# Patient Record
Sex: MALE | URBAN - METROPOLITAN AREA
[De-identification: names, ages, dates, MRNs, and addresses within clinical notes are randomized per-mention and may not be internally consistent; named-entity substitution may affect disease eponyms.]

---
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
## 2023-01-17 NOTE — PROCEDURE: MEDICATION COUNSELING
Pt verbalizes understanding of plan and will call in one week with update.    Tranexamic Acid Pregnancy And Lactation Text: It is unknown if this medication is safe during pregnancy or breast feeding.

## 2023-01-25 ENCOUNTER — APPOINTMENT (OUTPATIENT)
Dept: URBAN - METROPOLITAN AREA CLINIC 319 | Age: 18
Setting detail: DERMATOLOGY
End: 2023-01-25

## 2023-01-31 ENCOUNTER — APPOINTMENT (OUTPATIENT)
Dept: URBAN - METROPOLITAN AREA CLINIC 319 | Age: 18
Setting detail: DERMATOLOGY
End: 2023-01-31

## 2023-01-31 ENCOUNTER — RX ONLY (RX ONLY)
Age: 18
End: 2023-01-31

## 2023-01-31 DIAGNOSIS — L70.0 ACNE VULGARIS: ICD-10-CM

## 2023-01-31 PROCEDURE — OTHER COUNSELING: OTHER

## 2023-01-31 PROCEDURE — 99214 OFFICE O/P EST MOD 30 MIN: CPT

## 2023-01-31 PROCEDURE — OTHER ISOTRETINOIN MONITORING: OTHER

## 2023-01-31 PROCEDURE — OTHER CONSENT FOR TELEMEDICINE VISIT OBTAINED: OTHER

## 2023-01-31 PROCEDURE — OTHER PRESCRIPTION MEDICATION MANAGEMENT: OTHER

## 2023-01-31 PROCEDURE — OTHER COUNSELING: ISOTRETINOIN: OTHER

## 2023-01-31 PROCEDURE — OTHER RECORDS REVIEWED: OTHER

## 2023-01-31 RX ORDER — ISOTRETINOIN 32 MG/1
CAPSULE ORAL
Qty: 30 | Refills: 0 | Status: ERX

## 2023-01-31 NOTE — HPI: MEDICATION (ISOTRETINOIN)
Is This A New Presentation, Or A Follow-Up?: Follow Up Isotretinoin
Additional History: Patient is on his 4th month of absorica LD 32mg QD with a little less than 10 capsules left. He has been noncompliant with regimen last time it was called in was November. He states he has been clear but flared last week due to having strep

## 2023-01-31 NOTE — PROCEDURE: ISOTRETINOIN MONITORING
Dosing Month 3 (Required For Cumulative Dosing): 32mg Daily
Cheilitis Aggressive Treatment: I recommended application of Vaseline or Aquaphor numerous times a day (as often as every hour) and before going to bed. I also prescribed a topical steroid for twice daily use.
Comments: Treatment goal is 100% clearance of inflammatory lesions. Patient is currently on or finishing month #:4th of absorica LD 32mg. We have counseled patient on the importance of taking medication as as directed and may continue until 6-7 months. We will follow up in 2 months, call in new prescription today and he will call back in month for refill
Are Labs Available For Review?: Yes
Hypercholesterolemia Monitoring: I explained this is common when taking isotretinoin. We will monitor closely.
Cheilitis Normal Treatment: I recommended application of Vaseline or Aquaphor numerous times a day (as often as every hour) and before going to bed.
Any Depression?: No
Xerosis Normal Treatment: I recommended application of Cetaphil or CeraVe numerous times a day going to bed to all dry areas.
Retinoid Dermatitis Normal Treatment: I recommended more frequent application of Cetaphil or CeraVe to the areas of dermatitis.
Xerosis Aggressive Treatment: I recommended application of Cetaphil or CeraVe numerous times a day going to bed to all dry areas. I also prescribed a topical steroid for twice daily use.
Xerosis Normal Treatment: I recommended application of Cetaphil or CeraVe numerous times a day and before going to bed to all dry areas.
Is Retinoid Dermatitis Present?: Yes - Normal Treatment
Retinoid Dermatitis Aggressive Treatment: I recommended more frequent application of Cetaphil or CeraVe to the areas of dermatitis. I also prescribed a topical steroid for twice daily use until the dermatitis resolves.
Nosebleeds Normal Treatment: I explained this is common when taking isotretinoin. I recommended saline mist in each nostril multiple times a day. If this worsens they will contact us.
Hypertriglyceridemia Treatment: I explained this is common when taking isotretinoin. If this worsens they will contact us. They may try OTC ibuprofen.
Female Completion Statement: After discussing her treatment course we decided to discontinue isotretinoin therapy at this time. I explained that she would need to continue her birth control methods for at least one month after the last dosage. She should also get a pregnancy test one month after the last dose. She shouldn't donate blood for one month after the last dose. She should call with any new symptoms of depression.
Next Month's Dosage: Continue Current Dosage
Kilograms Preamble Statement (Weight Entered In Details Tab): Reported Weight in kilograms:
Myalgia Monitoring: I explained this is common when taking isotretinoin. If this worsens they will contact us.
Target Cumulative Dosage (In Mg/Kg): 135
Upper Range (In Mg/Kg): 150
Months Of Therapy Completed: 4
Headache Monitoring: I recommended monitoring the headaches for now. There is no evidence of increased intracranial pressure. They were instructed to call if the headaches are worsening.
Pounds Preamble Statement (Weight Entered In Details Tab): Reported Weight in pounds:
Detail Level: Zone
Xerosis Aggressive Treatment: I recommended application of Cetaphil or CeraVe numerous times a day and before going to bed to all dry areas. I also prescribed a topical steroid for twice daily use.
Lower Range (In Mg/Kg): 120
Counseling Text: I reviewed the side effect in detail. Patient should get monthly blood tests, not donate blood, not drive at night if vision affected, and not share medication.
Female Pregnancy Counseling Text: Female patients should also be on two forms of birth control while taking this medication and for one month after their last dose.
Weight Units: pounds
Use Therapeutic Ranged Or Therapeutic Target: please select Range or Target
What Is The Patient's Gender: Male
Male Completion Statement: After discussing his treatment course we decided to discontinue isotretinoin therapy at this time. He shouldn't donate blood for one month after the last dose. He should call with any new symptoms of depression.

## 2023-03-28 ENCOUNTER — RX ONLY (RX ONLY)
Age: 18
End: 2023-03-28

## 2023-03-28 ENCOUNTER — APPOINTMENT (OUTPATIENT)
Dept: URBAN - METROPOLITAN AREA CLINIC 319 | Age: 18
Setting detail: DERMATOLOGY
End: 2023-03-28

## 2023-03-28 DIAGNOSIS — L70.0 ACNE VULGARIS: ICD-10-CM

## 2023-03-28 PROCEDURE — OTHER RECORDS REVIEWED: OTHER

## 2023-03-28 PROCEDURE — OTHER CONSENT FOR TELEMEDICINE VISIT OBTAINED: OTHER

## 2023-03-28 PROCEDURE — OTHER PRESCRIPTION MEDICATION MANAGEMENT: OTHER

## 2023-03-28 PROCEDURE — OTHER COUNSELING: OTHER

## 2023-03-28 PROCEDURE — OTHER COUNSELING: ISOTRETINOIN: OTHER

## 2023-03-28 PROCEDURE — OTHER ISOTRETINOIN MONITORING: OTHER

## 2023-03-28 PROCEDURE — OTHER PHOTO-DOCUMENTATION: OTHER

## 2023-03-28 PROCEDURE — 99214 OFFICE O/P EST MOD 30 MIN: CPT

## 2023-03-28 RX ORDER — ISOTRETINOIN 40 MG/1
CAPSULE, LIQUID FILLED ORAL
Qty: 30 | Refills: 0 | Status: ERX | COMMUNITY
Start: 2023-03-28

## 2023-03-28 NOTE — HPI: MEDICATION (ISOTRETINOIN)
Is This A New Presentation, Or A Follow-Up?: Follow Up Isotretinoin
Additional History: Patient is on his 5th month of isotretinoin 40mg with 15 capsules left. Patient has been non compliant with medication, last time it was called in was January. He’s had more flare ups recently

## 2023-03-28 NOTE — PROCEDURE: ISOTRETINOIN MONITORING
Show Text Field For Brand Names Of Contraception?: Yes
Weight Units: pounds
Is Cheilitis Present?: Yes - Normal Treatment
Include Validation In Note: No
Dosing Month 4 (Required For Cumulative Dosing): 40mg Daily
Hypertriglyceridemia Treatment: I explained this is common when taking isotretinoin. If this worsens they will contact us. They may try OTC ibuprofen.
Next Month's Dosage: Continue Current Dosage
Cheilitis Normal Treatment: I recommended application of Vaseline or Aquaphor numerous times a day (as often as every hour) and before going to bed.
Myalgia Monitoring: I explained this is common when taking isotretinoin. If this worsens they will contact us.
Xerosis Aggressive Treatment: I recommended application of Cetaphil or CeraVe numerous times a day going to bed to all dry areas. I also prescribed a topical steroid for twice daily use.
Headache Monitoring: I recommended monitoring the headaches for now. There is no evidence of increased intracranial pressure. They were instructed to call if the headaches are worsening.
Counseling Text: I reviewed the side effect in detail. Patient should get monthly blood tests, not donate blood, not drive at night if vision affected, and not share medication.
Months Of Therapy Completed: 5
What Is The Patient's Gender: Male
Nosebleeds Normal Treatment: I explained this is common when taking isotretinoin. I recommended saline mist in each nostril multiple times a day. If this worsens they will contact us.
Hypertriglyceridemia Monitoring: I explained this is common when taking isotretinoin. We will monitor closely.
Xerosis Normal Treatment: I recommended application of Cetaphil or CeraVe numerous times a day and before going to bed to all dry areas.
Pounds Preamble Statement (Weight Entered In Details Tab): Reported Weight in pounds:
Cheilitis Aggressive Treatment: I recommended application of Vaseline or Aquaphor numerous times a day (as often as every hour) and before going to bed. I also prescribed a topical steroid for twice daily use.
Lower Range (In Mg/Kg): 120
Comments: Treatment goal is 100% clearance of inflammatory lesions. Patient is currently on or finishing month #:5th, we will continue current regimen of isotretinoin 40mg PO QD. Due to patient being noncompliant with regimen we will continue for 8 months. We will call in refill today and he will call in next month to refill.
Target Cumulative Dosage (In Mg/Kg): 135
Use Therapeutic Ranged Or Therapeutic Target: please select Range or Target
Kilograms Preamble Statement (Weight Entered In Details Tab): Reported Weight in kilograms:
Retinoid Dermatitis Aggressive Treatment: I recommended more frequent application of Cetaphil or CeraVe to the areas of dermatitis. I also prescribed a topical steroid for twice daily use until the dermatitis resolves.
Xerosis Normal Treatment: I recommended application of Cetaphil or CeraVe numerous times a day going to bed to all dry areas.
Upper Range (In Mg/Kg): 150
Xerosis Aggressive Treatment: I recommended application of Cetaphil or CeraVe numerous times a day and before going to bed to all dry areas. I also prescribed a topical steroid for twice daily use.
Male Completion Statement: After discussing his treatment course we decided to discontinue isotretinoin therapy at this time. He shouldn't donate blood for one month after the last dose. He should call with any new symptoms of depression.
Detail Level: Zone
Retinoid Dermatitis Normal Treatment: I recommended more frequent application of Cetaphil or CeraVe to the areas of dermatitis.
Female Completion Statement: After discussing her treatment course we decided to discontinue isotretinoin therapy at this time. I explained that she would need to continue her birth control methods for at least one month after the last dosage. She should also get a pregnancy test one month after the last dose. She shouldn't donate blood for one month after the last dose. She should call with any new symptoms of depression.
Female Pregnancy Counseling Text: Female patients should also be on two forms of birth control while taking this medication and for one month after their last dose.

## 2023-04-20 NOTE — PROCEDURE: MEDICATION COUNSELING
Modified Advancement Flap Text: The defect edges were debeveled with a #15 scalpel blade.  Given the location of the defect, shape of the defect and the proximity to free margins a modified advancement flap was deemed most appropriate.  Using a sterile surgical marker, an appropriate advancement flap was drawn incorporating the defect and placing the expected incisions within the relaxed skin tension lines where possible.    The area thus outlined was incised deep to adipose tissue with a #15 scalpel blade.  The skin margins were undermined to an appropriate distance in all directions utilizing iris scissors. Clindamycin Pregnancy And Lactation Text: This medication can be used in pregnancy if certain situations. Clindamycin is also present in breast milk.